# Patient Record
Sex: FEMALE | Race: WHITE | Employment: OTHER | URBAN - METROPOLITAN AREA
[De-identification: names, ages, dates, MRNs, and addresses within clinical notes are randomized per-mention and may not be internally consistent; named-entity substitution may affect disease eponyms.]

---

## 2017-01-01 ENCOUNTER — OFFICE VISIT (OUTPATIENT)
Dept: AUDIOLOGY | Facility: CLINIC | Age: 82
End: 2017-01-01

## 2017-01-01 DIAGNOSIS — H90.3 SENSORINEURAL HEARING LOSS, BILATERAL: ICD-10-CM

## 2017-01-01 DIAGNOSIS — H90.3 SENSORINEURAL HEARING LOSS, BILATERAL: Primary | ICD-10-CM

## 2017-07-31 NOTE — Clinical Note
Take a look, I may have missed something. Thanks! SNHL bilateral Cochlear implant programming/troubleshooting.

## 2017-07-31 NOTE — PROGRESS NOTES
AUDIOLOGY REPORT    BACKGROUND INFORMATION: Dr. Taz Cobb implanted iMya Pulido with a right  Nucleus 24 Contour Advance cochlear implant on 8/26/02 due to bilateral severe to profound sensorineural hearing loss and lack of benefit from hearing aids.  She reports she needs cochlear implant programming today 7/31/2017. She also reports experiencing intermittent sound with her Nucleus 5 processor. She was accompanied to her appointment today by her daughter.      PATIENT REPORT: Miya reports that she has been having more difficulty hearing recently. She reports that the processor will sometimes change programs on it's own, and that it is intermittent. She has not changed her microphone covers on her  processor so this was completed in the office. Miya also reports that she is terminally ill, and wants to discuss her options to make sure she is hearing her best. The processor, headpiece and cable are no longer under warranty, so the options discussed were paying for a repaired processor, or looking into an upgraded Nucleus 6 or Nucleus 7 processor. After receiving an estimate from [employer] for repairing the N5 processor, Miya would like to proceed with the repair order, as well as a new cable and headpiece.    FITTING SESSION: Dr. Taz Cobb, cochlear implant surgeon, ordered today's programming appointment. The patient came to the clinic for adjustment to the programs in the external speech processor and for assessment of the external components of the cochlear implant system. These components provide power and data to the internal device. Sound is only heard once the external portion is activated. Postoperative treatment, including device fitting and adjustment, audiologic assessments, and training are required at regular intervals.     Processor type: Nucleus 5 - Microphone covers changed before adjustments.    Headpiece type:   Magnet strength: #1 good retention and no skin  irritation.      TEST RESULTS:  Electrode Impedances: Stable and within tolerances for measured electrodes; Electrodes 20 and 8 (turned off) and ground 1 (ball electrode) are out of compliance as they have been in past.  She is programmed in MP2.  Facial Stimulation: Absent today, however, the patient reported a non-auditory sensation in her face when stimulating electrode #12, 14 and 15 in the past when just above eventual C level.  Will continue to monitor.      Tinnitus: Absent  Balance Problems: Absent  Pain/Discomfort: Absent  Strategies Tried: ACE 1200 Hz    New Map #68  Programs:  1. Everyday: ADRO only   2. Everyday: ADRO + Autosensitivity (Preferred program)  3. Noise: Zoom + ADRO + Autosensitivity  4. Focus: Beam + ADRO + Autosensitivity    Number of Channels per Program: 20    Threshold (T) and comfort (C) levels were measured on all 20 active electrodes.  T levels were adjusted slightly (increased in the low frequencies, and decreased in the highs).  C levels decreased at all electrodes by 2-6 cu.  The patient reported comfortable quality and volume after the changes.      Note: Programming was done with a loaner processor in clinic today, because Miya's processor would not connect to the software. When she receives her repaired processor, she will have access to her new programs. She was made aware of this today.    SUMMARY AND RECOMMENDATIONS: Miya was seen for reprogramming of her Nucleus 5 cochlear implant sound processor today and her microphone covers were changed. Her processor would not connect to the software, so programming was done with a loaner. She was pleased with the volume and sound quality after the changes. A repaired processor, headpiece and cable were ordered today.  She has not returned for testing for several years, but due to her terminal illness, she will not be scheduled for speech perception testing at this time. Further programming adjustments can be made if needed.  In the  meantime, she will receive the repaired processor, cable and headpiece at her home, and will send back the faulty processor to Co[facility]. Miya was sent home with a new magnet today for use with her new headpiece when it arrives.  The patient expressed understanding and agreement with this plan.      Donnie Pruitt M.A.  Audiology Doctoral Extern, #[id number]    I was present with the patient for the entire Audiology appointment including all procedures/testing performed by the AuD student, and agree with the student s assessment and plan as documented.    Iggy Watkins, CCC-A  Licensed Audiologist  [place] #[id number]

## 2017-07-31 NOTE — MR AVS SNAPSHOT
After Visit Summary   2017    Miya Pulido    MRN: [id number]           Patient Information     Date Of Birth          1932        Visit Information        Provider Department      2017 9:00 AM Patti Tobias AuD M [facility] Audiology        Today's Diagnoses     Sensorineural hearing loss, bilateral           Follow-ups after your visit        Who to contact     Please call your clinic at [phone] to:    Ask questions about your health    Make or cancel appointments    Discuss your medicines    Learn about your test results    Speak to your doctor   If you have compliments or concerns about an experience at your clinic, or if you wish to file a complaint, please contact [facility] Patient Relations at [phone] or email us at [email or web address]         Additional Information About Your Visit        MyChart Information     [employer] is an electronic gateway that provides easy, online access to your medical records. With [employer], you can request a clinic appointment, read your test results, renew a prescription or communicate with your care team.     To sign up for [employer]light[employer] visit the website at [email or web address]   You will be asked to enter the access code listed below, as well as some personal information. Please follow the directions to create your username and password.     Your access code is: [email or web address]  Expires: 10/15/2017  6:31 AM     Your access code will  in 90 days. If you need help or a new code, please contact your [facility] Clinic or call [phone] for assistance.        Care EveryWhere ID     This is your Care EveryWhere ID. This could be used by other organizations to access your [facility] medical records  [id number]         Blood Pressure from Last 3 Encounters:   No data found for BP    Weight from Last 3 Encounters:   No data found for Wt              We Performed the Following      AUDIOLOGY ADULT REFERRAL     RT: Diagnostic Analysis of CI 7 yrs & over, Subsequent Programming   (80783)        Primary Care Provider Office Phone # Fax #    Rene Gunn [phone]       [address] [address]        Equal Access to Services     RENETTA BARCENAS AH: Hadii aad ku hadasho Soomaali, waaxda luqadaha, qaybta kaalmada adeegyada, waxay idiin hayaan adeeg kharash la'aan ah. So [facility] [phone].    ATENCIÓN: Si habla español, tiene a ricketts disposición servicios gratuitos de asistencia lingüística. [facility] [phone].    We comply with applicable federal civil rights laws and [place] laws. We do not discriminate on the basis of race, color, national origin, age, disability sex, sexual orientation or gender identity.            Thank you!     Thank you for choosing [facility] AUDIOLOGY  for your care. Our goal is always to provide you with excellent care. Hearing back from our patients is one way we can continue to improve our services. Please take a few minutes to complete the written survey that you may receive in the mail after your visit with us. Thank you!             Your Updated Medication List - Protect others around you: Learn how to safely use, store and throw away your medicines at www.disposemymeds.org.          This list is accurate as of: 7/31/17  1:01 PM.  Always use your most recent med list.                   Brand Name Dispense Instructions for use Diagnosis    GABAPENTIN PO           ICAPS Caps           SERTRALINE HCL PO      Take by mouth daily        SIMVASTATIN PO      Take 1 tablet by mouth daily        SYSTANE OP      Apply 1 drop to eye 2 times daily